# Patient Record
Sex: MALE | Race: BLACK OR AFRICAN AMERICAN | NOT HISPANIC OR LATINO | URBAN - METROPOLITAN AREA
[De-identification: names, ages, dates, MRNs, and addresses within clinical notes are randomized per-mention and may not be internally consistent; named-entity substitution may affect disease eponyms.]

---

## 2017-03-27 ENCOUNTER — EMERGENCY (EMERGENCY)
Facility: HOSPITAL | Age: 37
LOS: 1 days | Discharge: HOME | End: 2017-03-27
Attending: EMERGENCY MEDICINE
Payer: MEDICAID

## 2017-03-27 VITALS
OXYGEN SATURATION: 100 % | HEART RATE: 64 BPM | RESPIRATION RATE: 17 BRPM | TEMPERATURE: 98 F | SYSTOLIC BLOOD PRESSURE: 121 MMHG | DIASTOLIC BLOOD PRESSURE: 65 MMHG

## 2017-03-27 VITALS
SYSTOLIC BLOOD PRESSURE: 132 MMHG | WEIGHT: 199.96 LBS | DIASTOLIC BLOOD PRESSURE: 60 MMHG | HEART RATE: 57 BPM | RESPIRATION RATE: 16 BRPM | HEIGHT: 72 IN | TEMPERATURE: 98 F | OXYGEN SATURATION: 100 %

## 2017-03-27 PROCEDURE — 93005 ELECTROCARDIOGRAM TRACING: CPT

## 2017-03-27 PROCEDURE — 99283 EMERGENCY DEPT VISIT LOW MDM: CPT | Mod: 25

## 2017-03-27 PROCEDURE — 71020: CPT | Mod: 26

## 2017-03-27 PROCEDURE — 99284 EMERGENCY DEPT VISIT MOD MDM: CPT

## 2017-03-27 PROCEDURE — 71046 X-RAY EXAM CHEST 2 VIEWS: CPT

## 2017-03-27 PROCEDURE — 94640 AIRWAY INHALATION TREATMENT: CPT

## 2017-03-27 RX ORDER — IPRATROPIUM/ALBUTEROL SULFATE 18-103MCG
3 AEROSOL WITH ADAPTER (GRAM) INHALATION ONCE
Qty: 0 | Refills: 0 | Status: COMPLETED | OUTPATIENT
Start: 2017-03-27 | End: 2017-03-27

## 2017-03-27 RX ORDER — IBUPROFEN 200 MG
1 TABLET ORAL
Qty: 20 | Refills: 0 | OUTPATIENT
Start: 2017-03-27 | End: 2017-04-01

## 2017-03-27 RX ORDER — IBUPROFEN 200 MG
600 TABLET ORAL ONCE
Qty: 0 | Refills: 0 | Status: COMPLETED | OUTPATIENT
Start: 2017-03-27 | End: 2017-03-27

## 2017-03-27 RX ADMIN — Medication 3 MILLILITER(S): at 14:08

## 2017-03-27 RX ADMIN — Medication 100 MILLIGRAM(S): at 14:08

## 2017-03-27 RX ADMIN — Medication 600 MILLIGRAM(S): at 14:03

## 2017-03-27 NOTE — ED ADULT NURSE NOTE - OBJECTIVE STATEMENT
RN FRANCISCO SY covering notes: AOX3 +ambulatory patient reports chest pain and shortness of breath started yesterday. Patient also complaining of cough. No recent travels no vomiting

## 2017-03-27 NOTE — ED PROVIDER NOTE - MEDICAL DECISION MAKING DETAILS
37 y/o M with productive cough, chest pain, and cold like Sx. Pt is Pt is otherwise healthy, vital signs are within normal limits and afebrile. Hx and findings suggestive of viral syndrome and MSKL chest pain. Will do CXR to r/o PNA treat with meds and reassess. 37 y/o M with productive cough, chest pain, and cold like Sx. Pt is Pt is otherwise healthy, vital signs are within normal limits and afebrile. Hx and findings suggestive of viral syndrome and MSK chest pain. PERC negative. ECG sinus brittany, no ischemic changes. Will do CXR to r/o PNA treat with meds and reassess.

## 2017-03-27 NOTE — ED PROVIDER NOTE - NS ED MD SCRIBE ATTENDING SCRIBE SECTIONS
VITAL SIGNS( Pullset)/REVIEW OF SYSTEMS/PHYSICAL EXAM/HISTORY OF PRESENT ILLNESS/PAST MEDICAL/SURGICAL/SOCIAL HISTORY/DISPOSITION/HIV

## 2017-03-27 NOTE — ED PROVIDER NOTE - CONDUCTED A DETAILED DISCUSSION WITH PATIENT AND/OR GUARDIAN REGARDING, MDM
return to ED if symptoms worsen, persist or questions arise radiology results/return to ED if symptoms worsen, persist or questions arise/need for outpatient follow-up

## 2017-03-27 NOTE — ED PROVIDER NOTE - CARE PLAN
Principal Discharge DX:	Viral infection Principal Discharge DX:	URTI (acute upper respiratory infection)  Secondary Diagnosis:	Cough

## 2017-03-27 NOTE — ED PROVIDER NOTE - OBJECTIVE STATEMENT
37 y/o M pt with no PMHx presents to the ED c/o CP x yesterday. Pt also notes bilateral ear pain, throat pain, difficulty breathing and cough productive of green mucus. Pt denies recent travel/immobilization, nausea, vomiting or any other complaints at this time. NKDA. 35 y/o M pt with no PMHx presents to the ED c/o CP x yesterday. Pt also notes bilateral ear pain, throat pain, difficulty breathing and cough productive of green mucus since yesterday. Former smoker. Pt denies recent travel/immobilization, nausea, vomiting, dizziness, palpitations, LE pain/swelling or any other complaints at this time. NKDA.

## 2017-03-27 NOTE — ED PROVIDER NOTE - ATTENDING CONTRIBUTION TO CARE
36 with no pmh here with cough, congestion and chest wall pain x 2 days. Normal exam. CXR does not show consolidation. Discussed anticipatory guidance. Pt feels better after meds. Dc with outpt follow up.

## 2017-03-27 NOTE — ED PROVIDER NOTE - PROGRESS NOTE DETAILS
The scribe's documentation has been prepared under my direction and personally reviewed by me in its entirety. I confirm that the note above actually reflects all work, treatment, procedures, and medical decision-making performed by me - KALLIE Davis ECG shows sinus brittany at 52 bpm. CXR NAD. Will discharge with PMD follow up and return instructions. Pt is well appearing walking with steady gait, stable for discharge and follow up without fail with medical doctor. I had a detailed discussion with the patient and/or guardian regarding the historical points, exam findings, and any diagnostic results supporting the discharge diagnosis. Pt educated on care and need for follow up. Strict return instructions and red flag signs and symptoms discussed with patient. Questions answered. Pt shows understanding of discharge information and agrees to follow.

## 2017-03-31 DIAGNOSIS — Z87.891 PERSONAL HISTORY OF NICOTINE DEPENDENCE: ICD-10-CM

## 2017-03-31 DIAGNOSIS — R00.1 BRADYCARDIA, UNSPECIFIED: ICD-10-CM

## 2017-03-31 DIAGNOSIS — H92.03 OTALGIA, BILATERAL: ICD-10-CM

## 2017-03-31 DIAGNOSIS — J06.9 ACUTE UPPER RESPIRATORY INFECTION, UNSPECIFIED: ICD-10-CM

## 2017-03-31 DIAGNOSIS — R05 COUGH: ICD-10-CM

## 2018-01-09 NOTE — ED PROVIDER NOTE - NS ED MD DISPO DISCHARGE CCDA
Increase Lexapro to 10 mg daily    Schedule occupational therapy and speech therapy and follow up with me in the springtime   Patient/Caregiver provided printed discharge information.
